# Patient Record
Sex: FEMALE | Race: WHITE | URBAN - METROPOLITAN AREA
[De-identification: names, ages, dates, MRNs, and addresses within clinical notes are randomized per-mention and may not be internally consistent; named-entity substitution may affect disease eponyms.]

---

## 2020-09-25 ENCOUNTER — TRANSFERRED RECORDS (OUTPATIENT)
Dept: HEALTH INFORMATION MANAGEMENT | Facility: CLINIC | Age: 25
End: 2020-09-25

## 2020-09-25 ENCOUNTER — MEDICAL CORRESPONDENCE (OUTPATIENT)
Dept: HEALTH INFORMATION MANAGEMENT | Facility: CLINIC | Age: 25
End: 2020-09-25

## 2020-10-01 ENCOUNTER — TRANSCRIBE ORDERS (OUTPATIENT)
Dept: OTHER | Age: 25
End: 2020-10-01

## 2020-10-01 DIAGNOSIS — R09.82 POST-NASAL DRAINAGE: ICD-10-CM

## 2020-10-01 DIAGNOSIS — J01.20 SUBACUTE ETHMOIDAL SINUSITIS: Primary | ICD-10-CM

## 2020-10-02 NOTE — TELEPHONE ENCOUNTER
FUTURE VISIT INFORMATION      FUTURE VISIT INFORMATION:    Date: 10/26/2020    Time: 9:30AM    Location: [facility]  REFERRAL INFORMATION:    Referring provider:  Veda Wallace NP    Referring providers clinic:  [facility]     Reason for visit/diagnosis  nasal drainage per pt     RECORDS REQUESTED FROM:       Clinic name Comments Records Status Imaging Status   [facility]  9/25/2020 note and referral  Scanned in Epic    Imaging 10/9/2020 CT Sinus (scheduled)  Epic PACS

## 2020-10-09 ENCOUNTER — ANCILLARY PROCEDURE (OUTPATIENT)
Dept: CT IMAGING | Facility: CLINIC | Age: 25
End: 2020-10-09
Attending: NURSE PRACTITIONER
Payer: COMMERCIAL

## 2020-10-09 DIAGNOSIS — R09.82 POST-NASAL DRAINAGE: ICD-10-CM

## 2020-10-09 DIAGNOSIS — J01.20 SUBACUTE ETHMOIDAL SINUSITIS: ICD-10-CM

## 2020-10-09 PROCEDURE — 70486 CT MAXILLOFACIAL W/O DYE: CPT | Performed by: RADIOLOGY

## 2020-10-26 ENCOUNTER — PRE VISIT (OUTPATIENT)
Dept: OTOLARYNGOLOGY | Facility: CLINIC | Age: 25
End: 2020-10-26

## 2020-10-26 ENCOUNTER — OFFICE VISIT (OUTPATIENT)
Dept: OTOLARYNGOLOGY | Facility: CLINIC | Age: 25
End: 2020-10-26
Attending: NURSE PRACTITIONER
Payer: COMMERCIAL

## 2020-10-26 VITALS — OXYGEN SATURATION: 98 % | HEART RATE: 91 BPM | WEIGHT: 159 LBS

## 2020-10-26 DIAGNOSIS — J31.1 CHRONIC NASOPHARYNGITIS: Primary | ICD-10-CM

## 2020-10-26 PROCEDURE — 31231 NASAL ENDOSCOPY DX: CPT | Performed by: OTOLARYNGOLOGY

## 2020-10-26 PROCEDURE — 99203 OFFICE O/P NEW LOW 30 MIN: CPT | Mod: 25 | Performed by: OTOLARYNGOLOGY

## 2020-10-26 ASSESSMENT — PAIN SCALES - GENERAL: PAINLEVEL: NO PAIN (0)

## 2020-10-26 NOTE — LETTER
October 26, 2020      Kaya Hernandez  [address] [place] [address]        To Whom It May Concern:    Kaya Hernandez  was seen on 10/26/20.  Please excuse her from class for this appointment.        Sincerely,        Cordell Gifford MD

## 2020-10-26 NOTE — NURSING NOTE
Chief Complaint   Patient presents with     Consult     Nasal drainage         Pulse 91, weight 72.1 kg (159 lb), SpO2 98 %.    Bijal Simmons EMT

## 2020-10-26 NOTE — PROGRESS NOTES
HISTORY OF PRESENT ILLNESS:  Ms. Hernandez is here today for a first visit to see us.  She states that throughout her adult life, she has had intermittent issues with tonsilliths as well as a sense of purulent postnasal drainage that comes from her nose.  She states that it is malodorous and thick.  She has tried nasal irrigations to deal with this with limited improvement.  She states that it really has been going on for quite some time and it is disruptive to her quality of life.  She has not tried any antibiotics.  She has been on nasal steroid sprays without improvement.  She otherwise feels well without constitutional symptoms.      PAST MEDICAL HISTORY:  Otherwise negative.      SOCIAL HISTORY:  The patient does not smoke cigarettes.  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      FAMILY HISTORY:  Noted and reviewed in the chart.      REVIEW OF SYSTEMS:  Pertinent positives and negatives as above.  Otherwise, complete review of systems is noted and reviewed in the chart.      PHYSICAL EXAMINATION:  This is a 25-year-old woman in no acute distress.  Normal mood, normal affect, normal ability to communicate.  Alert and appropriate.  Head is normocephalic.  Cranial nerve VII is House-Brackmann I out of VI bilaterally.  Breathing without difficulty or stridor.  Eyes are anicteric.  Skin of the head and neck appears normal. Examination of the nose is performed.  The patient does have a notable septal deviation to the left side.        PROCEDURE:  After verbal consent, the nose is sprayed with lidocaine and Afrin and nasal endoscopy is performed.  Both sides of the nose show no polyps or purulence.  The nasopharynx does show an area of moderately inflamed adenoid tissue.  Nasopharynx otherwise unremarkable.  Oropharynx and hypopharynx is normal larynx with normal appearance and normal function.      ASSESSMENT:  A 25-year-old with a sense of thick malodorous postnasal drainage that she is in some ways equates to  tonsilliths, but it is coming from her nose and is yellowish.        PLAN:  The plan at this point is to give her an empiric trial of Augmentin for two weeks and see her back in four weeks.  Should she continue to have symptoms despite medical management, could consider tonsillectomy and adenoidectomy at some point in the future.

## 2020-11-23 ENCOUNTER — OFFICE VISIT (OUTPATIENT)
Dept: OTOLARYNGOLOGY | Facility: CLINIC | Age: 25
End: 2020-11-23
Payer: COMMERCIAL

## 2020-11-23 VITALS — HEIGHT: 64 IN | WEIGHT: 159 LBS | BODY MASS INDEX: 27.14 KG/M2

## 2020-11-23 DIAGNOSIS — J35.02 ADENOIDITIS, CHRONIC: Primary | ICD-10-CM

## 2020-11-23 PROCEDURE — 99213 OFFICE O/P EST LOW 20 MIN: CPT | Performed by: OTOLARYNGOLOGY

## 2020-11-23 RX ORDER — FLUCONAZOLE 150 MG/1
150 TABLET ORAL ONCE
Qty: 1 TABLET | Refills: 0 | Status: SHIPPED | OUTPATIENT
Start: 2020-11-23 | End: 2020-12-23

## 2020-11-23 ASSESSMENT — PAIN SCALES - GENERAL: PAINLEVEL: NO PAIN (0)

## 2020-11-23 ASSESSMENT — MIFFLIN-ST. JEOR: SCORE: 1451.22

## 2020-11-23 NOTE — PATIENT INSTRUCTIONS
You were seen in the ENT clinic today with Dr. Gifford    We would like you to follow up in 4 months       Please call our clinic for any questions, concerns, and/or worsening symptoms.      Clinic #[phone]       Option 1 for scheduling.    Thank you for allowing us to be a part of your care!    Jeanette BUCHANAN RNCC    If you need to reach me my direct line is: [phone]

## 2020-11-23 NOTE — LETTER
Date:December 1, 2020      Patient was self referred, no letter generated. Do not send.        [facility] Information

## 2020-11-23 NOTE — PROGRESS NOTES
HISTORY OF PRESENT ILLNESS:  Kaya is back to see us again today.  She has noted a significant improvement in her symptoms of postnasal drainage and malodorous mucus.  She feels that the antibiotics were helpful, although she did get some side effects consistent with a yeast infection and upset stomach, but Diflucan helped take care of the yeast infection and she is not having any more problems with gastrointestinal distress as well.      PHYSICAL EXAMINATION:  On exam today, she is in no acute distress.  Normal mood, normal affect, normal ability to communicate.  Alert and appropriate.  Head is normocephalic Cranial nerve VII is House-Brackmann I out of VI bilaterally.  Breathing without difficulty or stridor.  Eyes are anicteric.  Skin of the head and neck appears normal.      PROCEDURE NOTE:  Examination of the nose is performed and shows healthy nasal mucosa without polyps or purulence.  Oral cavity shows normal tongue, buccal mucosa and oropharynx.        ASSESSMENT:  A 25-year-old with what seems to have been chronic nasal pharyngitis/adenoiditis.      PLAN:  At this point, plan on observation. Should she need more antibiotics on a regular basis due to a recurrence of her symptoms, could consider tonsillectomy and adenoidectomy.  At this point; however, she does well for a long time, we would again recommend observation.  I have given her a prescription for Augmentin and Diflucan in case his symptoms recur.  We will follow-up with her in four months.

## 2020-11-23 NOTE — LETTER
11/23/2020       RE: Kaya Hernandez  [address]  [place] [address]     Dear Colleague,    Thank you for referring your patient, Kaya Hernandez, to the [facility] E[facility] NOSE AND THROAT CLINIC [place] at [facility]. Please see a copy of my visit note below.    HISTORY OF PRESENT ILLNESS:  Kaya is back to see us again today.  She has noted a significant improvement in her symptoms of postnasal drainage and malodorous mucus.  She feels that the antibiotics were helpful, although she did get some side effects consistent with a yeast infection and upset stomach, but Diflucan helped take care of the yeast infection and she is not having any more problems with gastrointestinal distress as well.      PHYSICAL EXAMINATION:  On exam today, she is in no acute distress.  Normal mood, normal affect, normal ability to communicate.  Alert and appropriate.  Head is normocephalic Cranial nerve VII is House-Brackmann I out of VI bilaterally.  Breathing without difficulty or stridor.  Eyes are anicteric.  Skin of the head and neck appears normal.      PROCEDURE NOTE:  Examination of the nose is performed and shows healthy nasal mucosa without polyps or purulence.  Oral cavity shows normal tongue, buccal mucosa and oropharynx.        ASSESSMENT:  A 25-year-old with what seems to have been chronic nasal pharyngitis/adenoiditis.      PLAN:  At this point, plan on observation. Should she need more antibiotics on a regular basis due to a recurrence of her symptoms, could consider tonsillectomy and adenoidectomy.  At this point; however, she does well for a long time, we would again recommend observation.  I have given her a prescription for Augmentin and Diflucan in case his symptoms recur.  We will follow-up with her in four months.           Again, thank you for allowing me to participate in the care of your patient.      Sincerely,    Cordell Gifford MD

## 2020-11-23 NOTE — NURSING NOTE
"Chief Complaint   Patient presents with     RECHECK     4 week follow up         Height 1.626 m (5' 4\"), weight 72.1 kg (159 lb).    Bijal Simmons, EMT    "

## 2020-12-20 DIAGNOSIS — J35.02 ADENOIDITIS, CHRONIC: ICD-10-CM

## 2020-12-23 ENCOUNTER — MYC MEDICAL ADVICE (OUTPATIENT)
Dept: OTOLARYNGOLOGY | Facility: CLINIC | Age: 25
End: 2020-12-23

## 2020-12-23 DIAGNOSIS — J35.02 ADENOIDITIS, CHRONIC: ICD-10-CM

## 2020-12-23 RX ORDER — FLUCONAZOLE 150 MG/1
150 TABLET ORAL ONCE
Qty: 1 TABLET | Refills: 1 | Status: SHIPPED | OUTPATIENT
Start: 2020-12-23 | End: 2020-12-23

## 2020-12-24 RX ORDER — FLUCONAZOLE 150 MG/1
TABLET ORAL
Qty: 1 TABLET | Refills: 0 | OUTPATIENT
Start: 2020-12-24

## 2020-12-24 NOTE — TELEPHONE ENCOUNTER
FLUCONAZOLE 150MG TABLETS  fluconazole (DIFLUCAN) 150 MG tablet 1 tablet 1 12/23/2020 12/23/2020 No   Sig - Route: Take 1 tablet (150 mg) by mouth once for 1 dose - Oral   Sent to pharmacy as: Fluconazole 150 MG Oral Tablet (DIFLUCAN)   Class: E-Prescribe   Order: [id number]   E-Prescribing Status: Receipt confirmed by pharmacy (12/23/2020  9:14 AM CST)   Printout Tracking    External Result Report   Pharmacy    [id number] - [place], [place] - [address] AT [facility] [address]   Associated Diagnoses    Adenoiditis, chronic [J35.02]

## 2021-01-04 ENCOUNTER — HEALTH MAINTENANCE LETTER (OUTPATIENT)
Age: 26
End: 2021-01-04

## 2021-02-05 ENCOUNTER — DOCUMENTATION ONLY (OUTPATIENT)
Dept: CARE COORDINATION | Facility: CLINIC | Age: 26
End: 2021-02-05

## 2021-02-08 ENCOUNTER — OFFICE VISIT (OUTPATIENT)
Dept: OTOLARYNGOLOGY | Facility: CLINIC | Age: 26
End: 2021-02-08
Payer: COMMERCIAL

## 2021-02-08 VITALS
RESPIRATION RATE: 15 BRPM | HEART RATE: 100 BPM | BODY MASS INDEX: 28 KG/M2 | WEIGHT: 164 LBS | DIASTOLIC BLOOD PRESSURE: 78 MMHG | SYSTOLIC BLOOD PRESSURE: 117 MMHG | HEIGHT: 64 IN | TEMPERATURE: 97.8 F | OXYGEN SATURATION: 96 %

## 2021-02-08 DIAGNOSIS — J00 NASOPHARYNGITIS: Primary | ICD-10-CM

## 2021-02-08 PROCEDURE — 99213 OFFICE O/P EST LOW 20 MIN: CPT | Mod: 25 | Performed by: OTOLARYNGOLOGY

## 2021-02-08 PROCEDURE — 92511 NASOPHARYNGOSCOPY: CPT | Performed by: OTOLARYNGOLOGY

## 2021-02-08 RX ORDER — DOXYCYCLINE HYCLATE 100 MG
100 TABLET ORAL 2 TIMES DAILY
Qty: 42 TABLET | Refills: 0 | Status: SHIPPED | OUTPATIENT
Start: 2021-02-08 | End: 2021-03-01

## 2021-02-08 RX ORDER — FLUCONAZOLE 150 MG/1
150 TABLET ORAL DAILY PRN
Qty: 10 TABLET | Refills: 0 | Status: SHIPPED | OUTPATIENT
Start: 2021-02-08

## 2021-02-08 ASSESSMENT — MIFFLIN-ST. JEOR: SCORE: 1473.9

## 2021-02-08 ASSESSMENT — PAIN SCALES - GENERAL: PAINLEVEL: NO PAIN (0)

## 2021-02-08 NOTE — LETTER
2/8/2021       RE: Kaya Hernandez  [address]  [place] [address]     Dear Colleague,    Thank you for referring your patient, Kaya Hernandez, to the [facility] EAR NOSE AND THROAT CLINIC [place] at [facility]. Please see a copy of my visit note below.    HISTORY OF PRESENT ILLNESS:  Kaya is back to see us today.  She states that she has had to take two separate courses of antibiotics since last we saw her.  The antibiotics, which were Augmentin, improved her symptoms of thick, discolored, and distasteful mucus in her throat.  I did see purulence in her nasopharynx associated with some inflammatory adenoid tissue when we first met, and we thus have felt that the symptoms are predominantly driven by her adenoid/nasopharynx and tonsils.  She has had a CT scan of her sinuses which was unremarkable.  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      PHYSICAL EXAMINATION:  This is a 25-year-old woman in no acute distress.  Normal mood, normal affect, normal ability to communicate.  Alert and appropriate.  Head is normocephalic.  Cranial nerve VII is House-Brackmann I out of VI bilaterally.  Breathing without difficulty or stridor.  Eyes are anicteric.  Skin of the head and neck appears normal.  Examination of the mouth shows symmetric tonsils without any obvious significant inflammation.      PROCEDURE:  At that point, the nose is sprayed with lidocaine and Afrin.  Nasal endoscopy is performed through the right side of the nose.  The nasopharynx does not show any clear source of inflammation or purulence.      ASSESSMENT AND PLAN:  A 25-year-old with symptoms of thick distasteful mucus which previously appeared to be coming from the nasopharynx.  At this time, we will put her on a course of antibiotics that could cover MRSA in case that has contributed to this.  We will do doxycycline for three weeks.  I  have also filled  for her some Diflucan for antibiotic-associated yeast infection.  At this time, the plan will be to see her back in two months.  She will call sooner with any worsening or symptoms prior to that.  Should she have recurrent symptoms that come on, she may benefit from a tonsillectomy and adenoidectomy.           Again, thank you for allowing me to participate in the care of your patient.      Sincerely,    Cordell Gifford MD

## 2021-02-08 NOTE — NURSING NOTE
"Chief Complaint   Patient presents with     RECHECK     follow up     Pulse 100, resp. rate 15, height 1.626 m (5' 4\"), weight 74.4 kg (164 lb), SpO2 96 %.    Gal Espinal LPN   "

## 2021-02-08 NOTE — PROGRESS NOTES
HISTORY OF PRESENT ILLNESS:  Kaya is back to see us today.  She states that she has had to take two separate courses of antibiotics since last we saw her.  The antibiotics, which were Augmentin, improved her symptoms of thick, discolored, and distasteful mucus in her throat.  I did see purulence in her nasopharynx associated with some inflammatory adenoid tissue when we first met, and we thus have felt that the symptoms are predominantly driven by her adenoid/nasopharynx and tonsils.  She has had a CT scan of her sinuses which was unremarkable.  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      PHYSICAL EXAMINATION:  This is a 25-year-old woman in no acute distress.  Normal mood, normal affect, normal ability to communicate.  Alert and appropriate.  Head is normocephalic.  Cranial nerve VII is House-Brackmann I out of VI bilaterally.  Breathing without difficulty or stridor.  Eyes are anicteric.  Skin of the head and neck appears normal.  Examination of the mouth shows symmetric tonsils without any obvious significant inflammation.      PROCEDURE:  At that point, the nose is sprayed with lidocaine and Afrin.  Nasal endoscopy is performed through the right side of the nose.  The nasopharynx does not show any clear source of inflammation or purulence.      ASSESSMENT AND PLAN:  A 25-year-old with symptoms of thick distasteful mucus which previously appeared to be coming from the nasopharynx.  At this time, we will put her on a course of antibiotics that could cover MRSA in case that has contributed to this.  We will do doxycycline for three weeks.  I  have also filled for her some Diflucan for antibiotic-associated yeast infection.  At this time, the plan will be to see her back in two months.  She will call sooner with any worsening or symptoms prior to that.  Should she have recurrent symptoms that come on, she may benefit from a tonsillectomy and adenoidectomy.

## 2021-04-05 ENCOUNTER — OFFICE VISIT (OUTPATIENT)
Dept: OTOLARYNGOLOGY | Facility: CLINIC | Age: 26
End: 2021-04-05
Payer: COMMERCIAL

## 2021-04-05 VITALS — TEMPERATURE: 98.4 F | BODY MASS INDEX: 28.15 KG/M2 | OXYGEN SATURATION: 97 % | HEART RATE: 76 BPM | WEIGHT: 164 LBS

## 2021-04-05 DIAGNOSIS — J35.8 TONSILLITH: Primary | ICD-10-CM

## 2021-04-05 PROCEDURE — 99213 OFFICE O/P EST LOW 20 MIN: CPT | Performed by: OTOLARYNGOLOGY

## 2021-04-05 ASSESSMENT — PAIN SCALES - GENERAL: PAINLEVEL: NO PAIN (0)

## 2021-04-05 NOTE — PATIENT INSTRUCTIONS
1. You were seen in the ENT Clinic today by Dr. Gifford.  If you have any questions or concerns after your appointment, please call   - Option 1: ENT Clinic: [phone]  - Option 2: Meagan (Dr. Gifford' nurse): [phone]    2.   Plan to return to as needed.    Meagan Cosme, RN  Clinical Coordinator  [facility] Otolaryngology  [phone]

## 2021-04-05 NOTE — LETTER
4/5/2021        RE: Kaya Hernandez  [address]  [place] [address]     Dear Colleague,    Thank you for referring your patient, Kaya Hernandez, to the [facility] EAR NOSE AND THROAT CLINIC [place] at [facility]. Please see a copy of my visit note below.    HISTORY OF PRESENT ILLNESS:  Kaya is back to see us again today.  She has noted that recently she has identified both tonsils as containing many tonsilliths.  She felt that removing these did improve her symptoms of postnasal drainage and throat irritation.      She notes that overall, she still has her symptoms that she has for many years, but that they are currently under somewhat better control.      PHYSICAL EXAMINATION:  On exam today, the patient is in no distress, alert and interactive.  Breathing without difficulty or stridor.  Eyes are anicteric.  Skin of the head and neck appears normal.  Oral cavity shows normal tongue, normal buccal mucosa.  Oropharynx shows 1+ tonsils bilaterally.      ASSESSMENT:  A 26-year-old woman with a sense of postnasal drainage with what seems likely to have been some intermittent adenoiditis as well as chronic tonsilliths.       PLAN:  At this point, we discussed the risks and benefits of tonsillectomy and adenoidectomy.  She will consider this going forward, and we will schedule it at her convenience should she decide.  She will call us should she want to have this done.     Again, thank you for allowing me to participate in the care of your patient.      Sincerely,    Cordell Gifford MD

## 2021-04-05 NOTE — NURSING NOTE
Chief Complaint   Patient presents with     RECHECK     4 week follow up         Pulse 76, temperature 98.4  F (36.9  C), weight 74.4 kg (164 lb), SpO2 97 %.    Bijal Simmons, EMT

## 2021-04-05 NOTE — PROGRESS NOTES
HISTORY OF PRESENT ILLNESS:  Kaya is back to see us again today.  She has noted that recently she has identified both tonsils as containing many tonsilliths.  She felt that removing these did improve her symptoms of postnasal drainage and throat irritation.      She notes that overall, she still has her symptoms that she has for many years, but that they are currently under somewhat better control.      PHYSICAL EXAMINATION:  On exam today, the patient is in no distress, alert and interactive.  Breathing without difficulty or stridor.  Eyes are anicteric.  Skin of the head and neck appears normal.  Oral cavity shows normal tongue, normal buccal mucosa.  Oropharynx shows 1+ tonsils bilaterally.      ASSESSMENT:  A 26-year-old woman with a sense of postnasal drainage with what seems likely to have been some intermittent adenoiditis as well as chronic tonsilliths.       PLAN:  At this point, we discussed the risks and benefits of tonsillectomy and adenoidectomy.  She will consider this going forward, and we will schedule it at her convenience should she decide.  She will call us should she want to have this done.

## 2021-04-19 ENCOUNTER — TELEPHONE (OUTPATIENT)
Dept: OTOLARYNGOLOGY | Facility: CLINIC | Age: 26
End: 2021-04-19

## 2021-04-19 NOTE — TELEPHONE ENCOUNTER
Patient called to schedule surgery with Dr. Gifford per recommendation. Patient is ready to move forward with scheduling.  Message sent to care team to for surgery orders. Patient aware we will call her in the near future.       Rosalina Simmons on 4/19/2021 at 9:57 AM

## 2021-04-26 ENCOUNTER — PREP FOR PROCEDURE (OUTPATIENT)
Dept: OTOLARYNGOLOGY | Facility: CLINIC | Age: 26
End: 2021-04-26

## 2021-04-26 DIAGNOSIS — J35.01 CHRONIC TONSILLITIS: Primary | ICD-10-CM

## 2021-04-26 NOTE — TELEPHONE ENCOUNTER
Received message from patient that she would like to go ahead and schedule surgery.    Order set placed for Tonsillectomy and Adenoidectomy per Dr. Gifford. Scheduling will call patient to set up surgery.    Karina Rosario LPN  Department of Otolaryngology

## 2021-05-04 ENCOUNTER — TELEPHONE (OUTPATIENT)
Dept: OTOLARYNGOLOGY | Facility: CLINIC | Age: 26
End: 2021-05-04

## 2021-05-04 NOTE — TELEPHONE ENCOUNTER
Left message regarding scheduling surgery/procedure with Dr. Gifford.   Writer left call back number on the patients voicemail.      Rosalina Simmons on 5/4/2021 at 3:12 PM   P: [phone]

## 2021-10-11 ENCOUNTER — HEALTH MAINTENANCE LETTER (OUTPATIENT)
Age: 26
End: 2021-10-11

## 2022-01-30 ENCOUNTER — HEALTH MAINTENANCE LETTER (OUTPATIENT)
Age: 27
End: 2022-01-30

## 2022-09-24 ENCOUNTER — HEALTH MAINTENANCE LETTER (OUTPATIENT)
Age: 27
End: 2022-09-24

## 2023-05-08 ENCOUNTER — HEALTH MAINTENANCE LETTER (OUTPATIENT)
Age: 28
End: 2023-05-08